# Patient Record
Sex: FEMALE
[De-identification: names, ages, dates, MRNs, and addresses within clinical notes are randomized per-mention and may not be internally consistent; named-entity substitution may affect disease eponyms.]

---

## 2019-10-31 ENCOUNTER — INBOUND DOCUMENT (OUTPATIENT)
Age: 64
End: 2019-10-31

## 2022-05-23 ENCOUNTER — EMERGENCY (EMERGENCY)
Facility: HOSPITAL | Age: 67
LOS: 0 days | Discharge: NURSING, REHAB OR LONG-TERM CARE | End: 2022-05-24
Attending: EMERGENCY MEDICINE | Admitting: EMERGENCY MEDICINE
Payer: MEDICARE

## 2022-05-23 VITALS
TEMPERATURE: 98 F | OXYGEN SATURATION: 100 % | HEART RATE: 87 BPM | SYSTOLIC BLOOD PRESSURE: 120 MMHG | DIASTOLIC BLOOD PRESSURE: 69 MMHG | WEIGHT: 199.96 LBS | RESPIRATION RATE: 20 BRPM

## 2022-05-23 DIAGNOSIS — M54.9 DORSALGIA, UNSPECIFIED: ICD-10-CM
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
## 2022-05-23 DIAGNOSIS — M41.9 SCOLIOSIS, UNSPECIFIED: ICD-10-CM

## 2022-05-23 DIAGNOSIS — G89.29 OTHER CHRONIC PAIN: ICD-10-CM

## 2022-05-23 DIAGNOSIS — M54.50 LOW BACK PAIN, UNSPECIFIED: ICD-10-CM

## 2022-05-23 RX ORDER — KETOROLAC TROMETHAMINE 30 MG/ML
15 SYRINGE (ML) INJECTION ONCE
Refills: 0 | Status: DISCONTINUED | OUTPATIENT
Start: 2022-05-23 | End: 2022-05-23

## 2022-05-23 RX ORDER — METHOCARBAMOL 500 MG/1
1500 TABLET, FILM COATED ORAL ONCE
Refills: 0 | Status: COMPLETED | OUTPATIENT
Start: 2022-05-23 | End: 2022-05-23

## 2022-05-23 RX ADMIN — Medication 15 MILLIGRAM(S): at 15:56

## 2022-05-23 RX ADMIN — METHOCARBAMOL 1500 MILLIGRAM(S): 500 TABLET, FILM COATED ORAL at 15:57

## 2022-05-23 RX ADMIN — Medication 15 MILLIGRAM(S): at 18:18

## 2022-05-23 NOTE — ED PROVIDER NOTE - NS ED ROS FT
Constitutional: (-) fever, (-) chills, (-) lethargy  Eyes: (-) eye pain, (-) visual changes, (-) discharge  ENMT: (-) nasal congestion, (-) sore throat. (-) neck pain (-) neck stiffness  Respiratory: (-) cough, (-) SOB, (-) GARCIA, (-) respiratory distress  Cardiac: (-) chest pain, (-) palpitations  GI: (-) abdominal pain, (-) nausea, (-) vomiting, (-) diarrhea.  :  (-) dysuria, (-) hematuria, (-) frequency   MS:  (+) back pain, (-) joint pain.  Neuro:  (-) headache, (-) numbness, (-) focal weakness, (-) tingling   Skin:  (-) rash  Except as documented in the HPI,  all other systems are negative

## 2022-05-23 NOTE — ED PROVIDER NOTE - CLINICAL SUMMARY MEDICAL DECISION MAKING FREE TEXT BOX
a/p; BP, resolved s/p toradol, dc back to [facility] [place] [facility], f/u pmd 1-2 weeks, strict return precautions

## 2022-05-23 NOTE — ED PROVIDER NOTE - NSFOLLOWUPINSTRUCTIONS_ED_ALL_ED_FT
Back Pain    Back pain is very common in adults. The cause of back pain is rarely dangerous and the pain often gets better over time. The cause of your back pain may not be known and may include strain of muscles or ligaments, degeneration of the spinal disks, or arthritis. Occasionally the pain may radiate down your leg(s). Over-the-counter medicines to reduce pain and inflammation are often the most helpful. Stretching and remaining active frequently helps the healing process.     SEEK IMMEDIATE MEDICAL CARE IF YOU HAVE ANY OF THE FOLLOWING SYMPTOMS: bowel or bladder control problems, unusual weakness or numbness in your arms or legs, nausea or vomiting, abdominal pain, fever, dizziness/lightheadedness.    Please follow up with your primary care physician and/or pain management doctor within 48 hours to reassess your symptoms.

## 2022-05-23 NOTE — ED ADULT NURSE NOTE - NSIMPLEMENTINTERV_GEN_ALL_ED
Implemented All Fall with Harm Risk Interventions:  [place] to call system. Call bell, personal items and telephone within reach. Instruct patient to call for assistance. Room bathroom lighting operational. Non-slip footwear when patient is off stretcher. Physically safe environment: no spills, clutter or unnecessary equipment. Stretcher in lowest position, wheels locked, appropriate side rails in place. Provide visual cue, wrist band, yellow gown, etc. Monitor gait and stability. Monitor for mental status changes and reorient to person, place, and time. Review medications for side effects contributing to fall risk. Reinforce activity limits and safety measures with patient and family. Provide visual clues: red socks.

## 2022-05-23 NOTE — ED PROVIDER NOTE - PATIENT PORTAL LINK FT
You can access the FollowMyHealth Patient Portal offered by [facility] by registering at the following website: [email or web address]. By joining [employer]’s FollowMyHealth portal, you will also be able to view your health information using other applications (apps) compatible with our system.

## 2022-05-23 NOTE — ED PROVIDER NOTE - PHYSICAL EXAMINATION
CONSTITUTIONAL: well-appearing, in NAD  SKIN: Warm dry, normal skin turgor  HEAD: NCAT  EYES: EOMI, PERRLA, no scleral icterus, conjunctiva pink  ENT: normal pharynx with no erythema or exudates  NECK: Supple; non tender. Full ROM. no c/t/l/s midline tenderness; + R paraspinal tenderness   CARD: RRR, no murmurs.  RESP: clear to ausculation b/l. No crackles or wheezing.  ABD: soft, non-tender, non-distended, no rebound or guarding.  EXT: Full ROM, no bony tenderness, no pedal edema, no calf tenderness  NEURO: normal motor. normal sensory. CN II-XII intact.   PSYCH: Cooperative, appropriate.

## 2022-05-23 NOTE — ED PROVIDER NOTE - ATTENDING CONTRIBUTION TO CARE
66F PMH scoliosis, bedbound from [facility], frequent ED visits, p/w bp. her usual pain in lower back sharp constant worse w movement, radiates down legs. no numbness, weakness. no incontinence /retention. pt requesting IM toradol. no fever, cough. no cp, sob. no abd pain, nvdc. no urinary sx.     on exam, AFVSS, well emmy nad, ncat, eomi, perrla, mmm, lctab, rrr nl s1s2 no mrg, abd soft ntnd, aaox3, no focal deficits, no le edema or calf ttp,     a/p; BP, resolved s/p toradol, dc back to [facility], f/u pmd 1-2 weeks, strict return precautions

## 2022-05-23 NOTE — ED PROVIDER NOTE - OBJECTIVE STATEMENT
67 yo F with PMH of scoliosis, L hip fracture, now non-ambulatory, chronic back pain, presenting for worsening back pain today after laying in chair at nursing home for too long. Patient denies trauma, fever, IVDA, n/w/t, headache, vision changes, dizziness, CP, SOB, NVD, dysuria.

## 2022-05-23 NOTE — ED PROVIDER NOTE - PROGRESS NOTE DETAILS
BK: Patient reports complete resolution of pain after getting full dose of toradol. Ready for discharge.

## 2022-05-23 NOTE — ED ADULT NURSE NOTE - NSPATIENTFLAG_GEN_A_ER
Purple DH (Discharge Huddle; Vulnerable Patient) The patient is a 71y Female complaining of see chief complaint quote.